# Patient Record
Sex: FEMALE | Race: WHITE | NOT HISPANIC OR LATINO | Employment: FULL TIME | URBAN - METROPOLITAN AREA
[De-identification: names, ages, dates, MRNs, and addresses within clinical notes are randomized per-mention and may not be internally consistent; named-entity substitution may affect disease eponyms.]

---

## 2017-11-14 ENCOUNTER — ANESTHESIA EVENT (OUTPATIENT)
Dept: PERIOP | Facility: HOSPITAL | Age: 62
End: 2017-11-14

## 2017-11-15 ENCOUNTER — PREP FOR SURGERY (OUTPATIENT)
Dept: OTHER | Facility: HOSPITAL | Age: 62
End: 2017-11-15

## 2017-11-15 ENCOUNTER — ANESTHESIA (OUTPATIENT)
Dept: PERIOP | Facility: HOSPITAL | Age: 62
End: 2017-11-15

## 2017-11-15 ENCOUNTER — HOSPITAL ENCOUNTER (OUTPATIENT)
Facility: HOSPITAL | Age: 62
Setting detail: HOSPITAL OUTPATIENT SURGERY
Discharge: HOME | End: 2017-11-15
Attending: INTERNAL MEDICINE | Admitting: INTERNAL MEDICINE

## 2017-11-15 ENCOUNTER — ON CAMPUS - OUTPATIENT (OUTPATIENT)
Dept: URBAN - METROPOLITAN AREA HOSPITAL 28 | Facility: HOSPITAL | Age: 62
End: 2017-11-15

## 2017-11-15 VITALS
RESPIRATION RATE: 15 BRPM | HEART RATE: 88 BPM | BODY MASS INDEX: 41.35 KG/M2 | TEMPERATURE: 97.6 F | DIASTOLIC BLOOD PRESSURE: 88 MMHG | SYSTOLIC BLOOD PRESSURE: 138 MMHG | WEIGHT: 210.6 LBS | HEIGHT: 60 IN | OXYGEN SATURATION: 100 %

## 2017-11-15 DIAGNOSIS — Z86.010 PERSONAL HISTORY OF COLONIC POLYPS: ICD-10-CM

## 2017-11-15 DIAGNOSIS — K63.5 POLYP OF COLON: ICD-10-CM

## 2017-11-15 DIAGNOSIS — Z12.11 SCREEN FOR COLON CANCER: ICD-10-CM

## 2017-11-15 PROCEDURE — 45380 COLONOSCOPY AND BIOPSY: CPT

## 2017-11-15 PROCEDURE — 25010000002 PROPOFOL 10 MG/ML EMULSION: Performed by: NURSE ANESTHETIST, CERTIFIED REGISTERED

## 2017-11-15 RX ORDER — SODIUM CHLORIDE 9 MG/ML
40 INJECTION, SOLUTION INTRAVENOUS AS NEEDED
Status: DISCONTINUED | OUTPATIENT
Start: 2017-11-15 | End: 2017-11-15 | Stop reason: HOSPADM

## 2017-11-15 RX ORDER — GLYCOPYRROLATE 0.2 MG/ML
INJECTION INTRAMUSCULAR; INTRAVENOUS AS NEEDED
Status: DISCONTINUED | OUTPATIENT
Start: 2017-11-15 | End: 2017-11-15 | Stop reason: SURG

## 2017-11-15 RX ORDER — LIDOCAINE HYDROCHLORIDE 20 MG/ML
INJECTION, SOLUTION INFILTRATION; PERINEURAL AS NEEDED
Status: DISCONTINUED | OUTPATIENT
Start: 2017-11-15 | End: 2017-11-15 | Stop reason: SURG

## 2017-11-15 RX ORDER — SODIUM CHLORIDE 0.9 % (FLUSH) 0.9 %
1-10 SYRINGE (ML) INJECTION AS NEEDED
Status: DISCONTINUED | OUTPATIENT
Start: 2017-11-15 | End: 2017-11-15 | Stop reason: HOSPADM

## 2017-11-15 RX ORDER — PROPOFOL 10 MG/ML
VIAL (ML) INTRAVENOUS AS NEEDED
Status: DISCONTINUED | OUTPATIENT
Start: 2017-11-15 | End: 2017-11-15 | Stop reason: SURG

## 2017-11-15 RX ORDER — SODIUM CHLORIDE, SODIUM LACTATE, POTASSIUM CHLORIDE, CALCIUM CHLORIDE 600; 310; 30; 20 MG/100ML; MG/100ML; MG/100ML; MG/100ML
9 INJECTION, SOLUTION INTRAVENOUS CONTINUOUS
Status: DISCONTINUED | OUTPATIENT
Start: 2017-11-15 | End: 2017-11-15 | Stop reason: HOSPADM

## 2017-11-15 RX ORDER — LIDOCAINE HYDROCHLORIDE 10 MG/ML
0.5 INJECTION, SOLUTION EPIDURAL; INFILTRATION; INTRACAUDAL; PERINEURAL ONCE AS NEEDED
Status: COMPLETED | OUTPATIENT
Start: 2017-11-15 | End: 2017-11-15

## 2017-11-15 RX ADMIN — LIDOCAINE HYDROCHLORIDE 100 MG: 20 INJECTION, SOLUTION INFILTRATION; PERINEURAL at 14:12

## 2017-11-15 RX ADMIN — PROPOFOL 50 MG: 10 INJECTION, EMULSION INTRAVENOUS at 14:18

## 2017-11-15 RX ADMIN — PROPOFOL 50 MG: 10 INJECTION, EMULSION INTRAVENOUS at 14:22

## 2017-11-15 RX ADMIN — PROPOFOL 50 MG: 10 INJECTION, EMULSION INTRAVENOUS at 14:15

## 2017-11-15 RX ADMIN — LIDOCAINE HYDROCHLORIDE 0.5 ML: 10 INJECTION, SOLUTION EPIDURAL; INFILTRATION; INTRACAUDAL; PERINEURAL at 12:55

## 2017-11-15 RX ADMIN — PROPOFOL 50 MG: 10 INJECTION, EMULSION INTRAVENOUS at 14:27

## 2017-11-15 RX ADMIN — SODIUM CHLORIDE, POTASSIUM CHLORIDE, SODIUM LACTATE AND CALCIUM CHLORIDE: 600; 310; 30; 20 INJECTION, SOLUTION INTRAVENOUS at 13:10

## 2017-11-15 RX ADMIN — SODIUM CHLORIDE, POTASSIUM CHLORIDE, SODIUM LACTATE AND CALCIUM CHLORIDE 9 ML/HR: 600; 310; 30; 20 INJECTION, SOLUTION INTRAVENOUS at 12:55

## 2017-11-15 RX ADMIN — PROPOFOL 100 MG: 10 INJECTION, EMULSION INTRAVENOUS at 14:13

## 2017-11-15 RX ADMIN — GLYCOPYRROLATE 0.1 MG: 0.2 INJECTION INTRAMUSCULAR; INTRAVENOUS at 14:09

## 2017-11-15 NOTE — PLAN OF CARE
Problem: GI Endoscopy (Adult)  Goal: Signs and Symptoms of Listed Potential Problems Will be Absent or Manageable (GI Endoscopy)  Outcome: Ongoing (interventions implemented as appropriate)    11/15/17 1226   GI Endoscopy   Problems Assessed (GI Endoscopy) all   Problems Present (GI Endoscopy) none

## 2017-11-15 NOTE — ANESTHESIA POSTPROCEDURE EVALUATION
Patient: Gretta Castellon    Procedure Summary     Date Anesthesia Start Anesthesia Stop Room / Location    11/15/17 [id number] [facility] / [facility]       Procedure Diagnosis Surgeon Provider    COLONOSCOPY; POLYPECTOMY (N/A ) Colon polyp  (K62.5) MD Baldo Andujar CRNA          Anesthesia Type: MAC  Last vitals  BP   144/90 (11/15/17 1446)   Temp   97.6 °F (36.4 °C) (11/15/17 1436)   Pulse   86 (11/15/17 1446)   Resp   15 (11/15/17 1446)     SpO2   100 % (11/15/17 1446)     Post Anesthesia Care and Evaluation    Patient location during evaluation: PHASE II  Patient participation: complete - patient participated  Level of consciousness: awake and alert  Pain score: 0  Pain management: adequate  Airway patency: patent  Anesthetic complications: No anesthetic complications  PONV Status: none  Cardiovascular status: acceptable  Respiratory status: acceptable  Hydration status: acceptable

## 2017-11-15 NOTE — PLAN OF CARE
Problem: Patient Care Overview (Adult)  Goal: Plan of Care Review  Outcome: Outcome(s) achieved Date Met:  11/15/17    11/15/17 1445   Coping/Psychosocial Response Interventions   Plan Of Care Reviewed With patient;family   Patient Care Overview   Progress no change   Outcome Evaluation   Outcome Summary/Follow up Plan stable       Goal: Adult Individualization and Mutuality  Outcome: Outcome(s) achieved Date Met:  11/15/17  Goal: Discharge Needs Assessment  Outcome: Outcome(s) achieved Date Met:  11/15/17    Problem: GI Endoscopy (Adult)  Goal: Signs and Symptoms of Listed Potential Problems Will be Absent or Manageable (GI Endoscopy)  Outcome: Outcome(s) achieved Date Met:  11/15/17

## 2017-11-15 NOTE — ANESTHESIA PREPROCEDURE EVALUATION
Anesthesia Evaluation     no history of anesthetic complications:  NPO Solid Status: > 8 hours  NPO Liquid Status: > 6 hours     Airway   Mallampati: I  TM distance: >3 FB  Neck ROM: full  no difficulty expected  Dental      Comment: Multiple caps on molars    Pulmonary - normal exam    breath sounds clear to auscultation  (+) a smoker (remote) Former,   Cardiovascular - normal exam  Exercise tolerance: good (4-7 METS)    Rhythm: regular  Rate: normal    (+) hyperlipidemia      Neuro/Psych- negative ROS  GI/Hepatic/Renal/Endo    (+)  GERD,     Musculoskeletal     Abdominal  - normal exam   Substance History      OB/GYN          Other   (+) arthritis (ffet. toe, hands)                                     Anesthesia Plan    ASA 2     MAC     intravenous induction   Anesthetic plan and risks discussed with patient.

## 2017-11-15 NOTE — OP NOTE
COLONOSCOPY  Procedure Report    Patient Name:  Gretta Castellon  YOB: 1955    Date of Surgery:  11/15/2017     Indications:  Screening for CRC    Pre-op Diagnosis:   K62.5    Post-Op Diagnosis Codes:     * Colon polyp [K63.5]         Procedure/CPT® Codes:      Procedure(s):  COLONOSCOPY; POLYPECTOMY    Staff:  Surgeon(s):  Brock Steele MD         Anesthesia: Monitor Anesthesia Care    Estimated Blood Loss: none    Specimens:   ID Type Source Tests Collected by Time Destination   A :  Polyp Large Intestine, Transverse Colon TISSUE EXAM Brock Steele MD 11/15/2017 1421        Implants:    Nothing was implanted during the procedure      Description of Procedure: After having signed informed consent, she was brought to the endoscopy suite, placed in the left lateral decubitus position and given her IV sedation. Rectal exam revealed no external lesions, normal anal tone, no rectal mass. The scope was introduced in the rectum, advanced under direct visualization with ease through the rectum, sigmoid colon, descending colon, to and around the splenic flexure; reduced; advanced through the transverse colon. As the scope was being advanced through the transverse colon, there was a diminutive 3 x 5 mm polyp that was biopsied, felt to be completely removed, sent as a transverse colon polyp. The scope was reduced and advanced through the transverse colon, to and around the hepatic flexure, reduced in the ascending colon and then advanced using rotational techniques into the cecum. The cecum was well prepped and identified by the appendiceal orifice and the ileocecal valve. It was normal appearing. The ileocecal valve was intubated. The terminal ileum was normal appearing. The scope was withdrawn back in the ascending colon, withdrawn slowly; examined the colon in a circumferential fashion. The colon was well prepped throughout. There were no further mucosal lesions noted throughout the  remainder of the exam, no diverticular openings. The site of polypectomy showed excellent hemostasis, no residual polyp. The scope was withdrawn back into the rectum and retroflexed revealing intact dentate line and no mucosal lesions. The scope was deretroflexed and withdrawn. Patient tolerated the procedure well.           Findings: Colon to TI good Prep  Polyps - transverse cold Biopsy    Complications: none    Recommendations: Results to be called  Repeat in 5 years      Brock Steele MD     Date: 11/15/2017  Time: 2:41 PM

## 2017-11-15 NOTE — BRIEF OP NOTE
COLONOSCOPY  Progress Note    Gretta Castellon  11/15/2017    Pre-op Diagnosis:   K62.5       Post-Op Diagnosis Codes:     * Colon polyp [K63.5]    Procedure/CPT® Codes:      Procedure(s):  COLONOSCOPY; POLYPECTOMY    Surgeon(s):  Brock Steele MD    Anesthesia: Monitor Anesthesia Care    Staff:   Circulator: Reta Gan RN  Scrub Person: Yissel Cowan    Estimated Blood Loss: none    Urine Voided: * No values recorded between 11/15/2017  2:08 PM and 11/15/2017  2:36 PM *    Specimens:                  ID Type Source Tests Collected by Time Destination   A :  Polyp Large Intestine, Transverse Colon TISSUE EXAM Brock Steele MD 11/15/2017 1421          Drains:           Findings: Colon to TI good Prep  Polyps - transverse cold Biopsy    Complications: none      Brock Steele MD     Date: 11/15/2017  Time: 2:39 PM

## 2017-11-15 NOTE — PLAN OF CARE
Problem: Patient Care Overview (Adult)  Goal: Plan of Care Review  Outcome: Ongoing (interventions implemented as appropriate)    11/15/17 1226   Coping/Psychosocial Response Interventions   Plan Of Care Reviewed With patient   Patient Care Overview   Progress improving   Outcome Evaluation   Outcome Summary/Follow up Plan vss, ready for procedure

## 2017-11-15 NOTE — PLAN OF CARE
Problem: Patient Care Overview (Adult)  Goal: Adult Individualization and Mutuality  Outcome: Ongoing (interventions implemented as appropriate)    11/15/17 1226   Individualization   Patient Specific Preferences Goes by Gretta

## 2017-11-15 NOTE — PLAN OF CARE
Problem: GI Endoscopy (Adult)  Goal: Signs and Symptoms of Listed Potential Problems Will be Absent or Manageable (GI Endoscopy)  Outcome: Ongoing (interventions implemented as appropriate)    11/15/17 1411   GI Endoscopy   Problems Assessed (GI Endoscopy) all   Problems Present (GI Endoscopy) none

## 2017-11-15 NOTE — H&P
"Patient Care Team:  Alma Gracia MD as PCP - General (Family Medicine)    CHIEF COMPLAINT: Screening for CRC    HISTORY OF PRESENT ILLNESS:    Last exam was 2007      Past Medical History:   Diagnosis Date   • Hypercholesteremia      Past Surgical History:   Procedure Laterality Date   • BUNIONECTOMY     • CARPAL TUNNEL RELEASE     • COLONOSCOPY     • TUBAL ABDOMINAL LIGATION       History reviewed. No pertinent family history.  Social History   Substance Use Topics   • Smoking status: Former Smoker     Years: 3.00     Types: Cigarettes   • Smokeless tobacco: Never Used   • Alcohol use Yes      Comment: occasional     Prescriptions Prior to Admission   Medication Sig Dispense Refill Last Dose   • ezetimibe-simvastatin (VYTORIN) 10-40 MG per tablet Take 1 tablet by mouth daily. 90 tablet 3 11/14/2017   • ibandronate (BONIVA) 150 MG tablet Take 1 tablet by mouth every 30 (thirty) days. 3 tablet 3 10/27/2017     Allergies:  Penicillins and Sulfa antibiotics    REVIEW OF SYSTEMS:  Please see the above history of present illness for pertinent positives and negatives.  The remainder of the patient's systems have been reviewed and are negative.     Vital Signs  Temp:  [98.7 °F (37.1 °C)] 98.7 °F (37.1 °C)  Heart Rate:  [98] 98  Resp:  [10] 10  BP: (128)/(91) 128/91    Flowsheet Rows         First Filed Value    Admission Height  60\" (152.4 cm) Documented at 11/14/2017 1455    Admission Weight  211 lb (95.7 kg) Documented at 11/14/2017 1455           Physical Exam:  Physical Exam   Constitutional: Patient appears well-developed and well-nourished and in no acute distress   HEENT:   Head: Normocephalic and atraumatic.   Eyes:  Pupils are equal, round, and reactive to light. EOM are intact. Sclera are anicteric and non-injected.  Mouth and Throat: Patient has moist mucous membranes. Oropharynx is clear of any erythema or exudate.     Neck: Neck supple. No JVD present. No thyromegaly present. No lymphadenopathy " present.  Cardiovascular: Regular rate, regular rhythm, S1 normal and S2 normal.  Exam reveals no gallop and no friction rub.  No murmur heard.  Pulmonary/Chest: Lungs are clear to auscultation bilaterally. No respiratory distress. No wheezes. No rhonchi. No rales.   Abdominal: Soft. Bowel sounds are normal. No distension and no mass. There is no hepatosplenomegaly. There is no tenderness.   Musculoskeletal: Normal Muscle tone  Extremities: No edema. Pulses are palpable in all 4 extremities.  Neurological: Patient is alert and oriented to person, place, and time. Cranial nerves II-XII are grossly intact with no focal deficits.  Skin: Skin is warm. No rash noted. Nails show no clubbing.  No cyanosis or erythema.     Results Review:    I reviewed the patient's new clinical results.  Lab Results (most recent)     None          Imaging Results (most recent)     None        reviewed    ECG/EMG Results (most recent)     None        reviewed    Assessment/Plan     Screening for CRC/ Colonoscopy      I discussed the patients findings and my recommendations with patient.     Brock Steele MD  11/15/17  2:05 PM    Time: 10 min prior to procedure.

## 2017-11-20 LAB
LAB AP CASE REPORT: NORMAL
Lab: NORMAL
PATH REPORT.FINAL DX SPEC: NORMAL

## 2024-09-12 ENCOUNTER — OFFICE VISIT (OUTPATIENT)
Dept: ORTHOPEDIC SURGERY | Facility: CLINIC | Age: 69
End: 2024-09-12
Payer: MEDICARE

## 2024-09-12 VITALS — HEIGHT: 60 IN | BODY MASS INDEX: 40.13 KG/M2 | WEIGHT: 204.4 LBS | TEMPERATURE: 98.8 F

## 2024-09-12 DIAGNOSIS — S92.354A CLOSED NONDISPLACED FRACTURE OF FIFTH METATARSAL BONE OF RIGHT FOOT, INITIAL ENCOUNTER: Primary | ICD-10-CM

## 2024-09-12 DIAGNOSIS — M19.071 ARTHRITIS OF FIRST METATARSOPHALANGEAL (MTP) JOINT OF RIGHT FOOT: ICD-10-CM

## 2024-09-12 DIAGNOSIS — M65.28 CALCIFIC ACHILLES TENDONITIS: ICD-10-CM

## 2024-09-12 DIAGNOSIS — R52 PAIN: ICD-10-CM

## 2024-09-12 DIAGNOSIS — M92.61 HAGLUND'S DEFORMITY OF RIGHT HEEL: ICD-10-CM

## 2024-09-12 DIAGNOSIS — M19.079 ARTHRITIS OF FOOT: ICD-10-CM

## 2024-09-12 RX ORDER — EZETIMIBE 10 MG/1
10 TABLET ORAL DAILY
COMMUNITY

## 2024-09-12 RX ORDER — IBUPROFEN 200 MG
TABLET ORAL
COMMUNITY
Start: 2015-05-07

## 2024-09-12 RX ORDER — SIMVASTATIN 40 MG
40 TABLET ORAL NIGHTLY
COMMUNITY

## 2024-09-12 RX ORDER — GINSENG 100 MG
CAPSULE ORAL
COMMUNITY

## 2024-09-12 NOTE — PROGRESS NOTES
New Patient Complaint      Patient: Gretta Castellon  YOB: 1955 68 y.o. female  Medical Record Number: [id number]    Chief Complaints: Foot hurts    History of Present Illness:   Patient reports an approximate 4-week history of moderate pain mainly in the lateral aspect of the right midfoot along the fifth more so than fourth metatarsal.  She does not recall any specific injury but does walk up to 4 miles quite frequently.  She has pain especially after she is standing on this in the shower and goes to lift her foot.  She is some occasional discomfort of the dorsum of the midfoot and into the arch as well.    She had previous bunion correction done by podiatry in the past with stiffness in the great toe subsequent to that but has not been particular painful.       HPI    Allergies:   Allergies   Allergen Reactions    Penicillins     Sulfa Antibiotics        Medications:   Current Outpatient Medications on File Prior to Visit   Medication Sig    Cholecalciferol 125 MCG (5000 UT) tablet Take  by mouth.    ezetimibe (ZETIA) 10 MG tablet Take 1 tablet by mouth Daily.    ibandronate (BONIVA) 150 MG tablet Take 1 tablet by mouth every 30 (thirty) days.    ibuprofen (ADVIL,MOTRIN) 200 MG tablet Take  by mouth.    Multiple Vitamins-Minerals (CENTRUM MINIS WOMEN 50+ PO) Take  by mouth.    simvastatin (ZOCOR) 40 MG tablet Take 1 tablet by mouth Every Night.    Zinc 50 MG tablet Take  by mouth.    [DISCONTINUED] ezetimibe-simvastatin (VYTORIN) 10-40 MG per tablet Take 1 tablet by mouth daily.    [DISCONTINUED] hydrocortisone (ANUSOL-HC) 2.5 % rectal cream Insert  into the rectum 2 (Two) Times a Day.     No current facility-administered medications on file prior to visit.       Past Medical History:   Diagnosis Date    Hypercholesteremia     Stress fracture     Maybe?     Past Surgical History:   Procedure Laterality Date    BUNIONECTOMY      CARPAL TUNNEL RELEASE      COLONOSCOPY      COLONOSCOPY N/A  "11/15/2017    Procedure: COLONOSCOPY; POLYPECTOMY;  Surgeon: Brock Steele MD;  Location: [facility];  Service:     HAND SURGERY  1979 right hand    Carpel tunnel    TUBAL ABDOMINAL LIGATION       Social History     Occupational History    Not on file   Tobacco Use    Smoking status: Former     Types: Cigarettes     Passive exposure: Past    Smokeless tobacco: Never    Tobacco comments:     As a teenager quit before age 20   Vaping Use    Vaping status: Never Used   Substance and Sexual Activity    Alcohol use: Yes     Comment: Just occasionally nothing regular    Drug use: No    Sexual activity: Never      Social History     Social History Narrative    Not on file     History reviewed. No pertinent family history.    Review of Systems: 14 point review of systems performed, positive pertinent findings identified in HPI. All remaining systems negative     Review of Systems      Physical Exam:   Vitals:    09/12/24 0801   Temp: 98.8 °F (37.1 °C)   Weight: 92.7 kg (204 lb 6.4 oz)   Height: 152.4 cm (60\")   PainSc:   3   PainLoc: Foot     Physical Exam   Constitutional: pleasant, well developed   Eyes: sclera non icteric  Hearing : adequate for exam  Cardiovascular: palpable pulses in right foot, right calf/ thigh NT without sign of DVT  Respiratoy: breathing unlabored   Neurological: grossly sensate to LT throughout right LE  Psychiatric: oriented with normal mood and affect.   Lymphatic: No palpable popliteal lymphadenopathy right LE  Skin: intact throughout right leg/foot  Musculoskeletal: On exam she has mild tenderness to palpation along the fifth more so than fourth metatarsal.  Mild discomfort along the dorsum of the midfoot.  There is well-healed incision on the first MTP joint with very limited motion but without focal tenderness to palpation.  She was nontender about the ankle  Physical Exam  Ortho Exam    Radiology: 3 views of the right foot ordered evaluate pain and alignment reviewed and no " prior x-rays available for comparison these x-rays show previous bunion correction with retained screw there is severe arthritis of the first metatarsal phalangeal joint.  There is also arthritis at the first TMT joint and some to the second TMT joint.  There is chronic changes along the proximal fifth metatarsal with some exostosis at the proximal far lateral extent and may have been previous stress fracture or stress reaction of the fourth and fifth metatarsals at the proximal one fourth.  There is hammering of the lesser toes.  There is also spurring at the insertion of the Achilles and plantarly with prominent superior calcaneal tuberosity.    Assessment/Plan: 1.  Right first MTP arthritis status post bunion correction with retained hardware  2.  Right midfoot arthritis   3.  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  Right probable proximal fifth and possibly fourth metatarsal stress fractures    We discussed treatment going forward and the first MTP arthritis not particularly symptomatic.  Main area of pain is in the lateral midfoot and I think she has developed a stress fracture release the fifth and possibly fourth metatarsals.  Does have some arthritis at the midfoot as well.    We discussed treatment and she was fitted with a short boot and instructed on using this for any weightbearing activity including offloading with a cane in the contralateral hand and to stick to activities of daily living only and avoid walking for exercise.    We discussed possible surgical treatment for this which she would wish to avoid if possible.  Will go ahead and get an MRI of her right foot to evaluate for any fracture of the fourth metatarsal as well as possible fifth proximal metatarsal stress fracture.    Will see her back in about 3 weeks to assess progress and determine treatment course going forward.

## 2024-09-24 ENCOUNTER — HOSPITAL ENCOUNTER (OUTPATIENT)
Dept: MRI IMAGING | Facility: HOSPITAL | Age: 69
Discharge: HOME | End: 2024-09-24
Admitting: ORTHOPAEDIC SURGERY
Payer: MEDICARE

## 2024-09-24 DIAGNOSIS — S92.354A CLOSED NONDISPLACED FRACTURE OF FIFTH METATARSAL BONE OF RIGHT FOOT, INITIAL ENCOUNTER: ICD-10-CM

## 2024-09-24 PROCEDURE — 73718 MRI LOWER EXTREMITY W/O DYE: CPT

## 2024-10-03 ENCOUNTER — LAB (OUTPATIENT)
Dept: LAB | Facility: HOSPITAL | Age: 69
End: 2024-10-03
Payer: MEDICARE

## 2024-10-03 ENCOUNTER — OFFICE VISIT (OUTPATIENT)
Dept: ORTHOPEDIC SURGERY | Facility: CLINIC | Age: 69
End: 2024-10-03
Payer: MEDICARE

## 2024-10-03 VITALS — WEIGHT: 204 LBS | BODY MASS INDEX: 40.05 KG/M2 | HEIGHT: 60 IN | TEMPERATURE: 97.5 F

## 2024-10-03 DIAGNOSIS — M19.071 ARTHRITIS OF FIRST METATARSOPHALANGEAL (MTP) JOINT OF RIGHT FOOT: ICD-10-CM

## 2024-10-03 DIAGNOSIS — S92.354A CLOSED NONDISPLACED FRACTURE OF FIFTH METATARSAL BONE OF RIGHT FOOT, INITIAL ENCOUNTER: ICD-10-CM

## 2024-10-03 DIAGNOSIS — E55.9 VITAMIN D DEFICIENCY: Primary | ICD-10-CM

## 2024-10-03 DIAGNOSIS — M92.61 HAGLUND'S DEFORMITY OF RIGHT HEEL: ICD-10-CM

## 2024-10-03 DIAGNOSIS — S92.344S CLOSED NONDISPLACED FRACTURE OF FOURTH METATARSAL BONE OF RIGHT FOOT, SEQUELA: ICD-10-CM

## 2024-10-03 DIAGNOSIS — R52 PAIN: ICD-10-CM

## 2024-10-03 DIAGNOSIS — M19.071 ARTHRITIS OF RIGHT FOOT: ICD-10-CM

## 2024-10-03 DIAGNOSIS — E55.9 VITAMIN D DEFICIENCY: ICD-10-CM

## 2024-10-03 LAB — 25(OH)D3 SERPL-MCNC: 54.5 NG/ML (ref 30–100)

## 2024-10-03 PROCEDURE — 36415 COLL VENOUS BLD VENIPUNCTURE: CPT

## 2024-10-03 PROCEDURE — 82306 VITAMIN D 25 HYDROXY: CPT

## 2024-10-03 NOTE — PROGRESS NOTES
"Foot Follow Up      Patient: Gretta Castellon    YOB: 1955 68 y.o. female    Chief Complaints: Foot feeling better    History of Present Illness:Patient was seen on 9/12/2024 reporting an approximate 4-week history of moderate pain mainly in the lateral aspect of the right midfoot along the fifth more so than fourth metatarsal.  She did not recall any specific injury but had been walking up to 4 miles quite frequently.  She had pain especially after she was standing on this in the shower and lifted her foot.  She had some occasional discomfort of the dorsum of the midfoot and into the arch as well.     She had had previous bunion correction done by podiatry in the past with stiffness in the great toe subsequent to that but ha    We discussed treatment going forward I felt she likely had a 5th metatarsal stress fracture and possibly of the fourth as well and additionally had first MTP arthritis with retained hardware from previous bunion correction as well as mild midfoot arthritis and some insertional Achilles calcifications.  She was fitted with a short boot and instructed on offloading with a cane and to limit activities to that of daily living only.  She wished avoid surgical treatment if at all possible.  She was sent for MRI for further evaluation    Patient is seen back today stating that her foot is feeling better she no longer has pain when she is in the shower and has been using her boot and cane and limiting her activity. pain is rated at 0 out of 10    She reports that she does take vitamin D 5000 units twice weekly.  She had a history of vitamin D deficiency about 6 years ago.  HPI    ROS: No foot pain  Past Medical History:   Diagnosis Date    CTS (carpal tunnel syndrome) 1979    Right hand surgery    Hypercholesteremia     Stress fracture     Maybe?     Physical Exam:   Vitals:    10/03/24 0925   Temp: 97.5 °F (36.4 °C)   Weight: 92.5 kg (204 lb)   Height: 152.4 cm (60\")   PainSc: 0-No " pain     Well developed with normal mood.  On exam she was nontender over the base of the fifth metatarsal nor the fourth.  She had good eversion strength.      Radiology: 3 views of the right foot ordered evaluate fracture alignment reviewed and compared to previous x-rays.  Fracture line is still slightly evident along the fifth metaphyseal diaphyseal junction there is some chronic hypertrophic callus lateral to this fracture line is somewhat less discernible than on previous x-rays.  There remains arthritis of the first MTP joint and some sclerotic change on the proximal fourth metatarsal consistent with likely old fracture.    MRI films and report of the right foot dated 9/24/2024 show edema-like marrow signal within the base and shaft of the fifth metatarsal with associated nondisplaced incomplete fracture of the fifth metatarsal base without significant displacement.  There was additional fracture line to the base of the fourth metatarsal without associated edema.  There was surgical change of the first MTP joint with moderate to severe arthritis and scattered midfoot arthritis.    Vitamin D 6/20/2022 87.3      Assessment/Plan: Right proximal fifth metatarsal stress fracture  2.  Right proximal fourth metatarsal fracture without edema-likely remote  3.  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  Right midfoot arthritis  5.  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    She has a history of vitamin D deficiency and takes 5000 units twice a week and her last level was good but this was several years ago.  Will send her over to the lab to have this rechecked and make sure we do not need to do  further augmentation.    Will have her continue with her boot full-time for weightbearing for another 2 weeks along with her cane and if doing well at that point may then start transitioning out of it to a sturdy stiff shoe with a lateral heel wedge in the house for a week and if doing well may then do as such in and out of the house.    She may do low impact exercise with her boot on at [employer] such as a recumbent elliptical.    If anything worsens she will let me know otherwise I will see her back in 4 weeks with x-rays of her right foot.

## 2024-10-04 ENCOUNTER — TELEPHONE (OUTPATIENT)
Dept: ORTHOPEDIC SURGERY | Facility: CLINIC | Age: 69
End: 2024-10-04
Payer: MEDICARE

## 2024-10-04 NOTE — TELEPHONE ENCOUNTER
----- Message from Sam Queen sent at 10/4/2024  1:35 PM EDT -----  Please let patient know that her vitamin D is at an adequate level and is not deemed to be augmented beyond what she is already taking.

## 2024-10-04 NOTE — TELEPHONE ENCOUNTER
Serum vitamin D level is 54.5.  Have left a message for the patient that her vitamin D level is in adequate range and no further changes to medications need to be made.  Have left it open for her to call if she has any questions or concerns

## 2024-10-31 ENCOUNTER — OFFICE VISIT (OUTPATIENT)
Dept: ORTHOPEDIC SURGERY | Facility: CLINIC | Age: 69
End: 2024-10-31
Payer: MEDICARE

## 2024-10-31 VITALS — WEIGHT: 208.8 LBS | BODY MASS INDEX: 40.99 KG/M2 | HEIGHT: 60 IN | TEMPERATURE: 97.3 F

## 2024-10-31 DIAGNOSIS — Z09 FOLLOW-UP EXAM: Primary | ICD-10-CM

## 2024-10-31 DIAGNOSIS — M19.071 ARTHRITIS OF FIRST METATARSOPHALANGEAL (MTP) JOINT OF RIGHT FOOT: ICD-10-CM

## 2024-10-31 DIAGNOSIS — S92.354D CLOSED NONDISPLACED FRACTURE OF FIFTH METATARSAL BONE OF RIGHT FOOT WITH ROUTINE HEALING, SUBSEQUENT ENCOUNTER: ICD-10-CM

## 2024-10-31 DIAGNOSIS — M92.61 HAGLUND'S DEFORMITY OF RIGHT HEEL: ICD-10-CM

## 2024-10-31 DIAGNOSIS — S92.344S CLOSED NONDISPLACED FRACTURE OF FOURTH METATARSAL BONE OF RIGHT FOOT, SEQUELA: ICD-10-CM

## 2024-10-31 DIAGNOSIS — M19.071 ARTHRITIS OF RIGHT FOOT: ICD-10-CM

## 2024-10-31 NOTE — PROGRESS NOTES
"Foot Follow Up      Patient: Gretta Castellon    YOB: 1955 68 y.o. female    Chief Complaints: Foot \"doing good\"    History of Present Illness:Patient was seen on 9/12/2024 reporting an approximate 4-week history of moderate pain mainly in the lateral aspect of the right midfoot along the fifth more so than fourth metatarsal.  She did not recall any specific injury but had been walking up to 4 miles quite frequently.  She had pain especially after she was standing on this in the shower and lifted her foot.  She had some occasional discomfort of the dorsum of the midfoot and into the arch as well.     She had had previous bunion correction done by podiatry in the past with stiffness in the great toe subsequent to that but ha     We discussed treatment going forward I felt she likely had a 5th metatarsal stress fracture and possibly of the fourth as well and additionally had first MTP arthritis with retained hardware from previous bunion correction as well as mild midfoot arthritis and some insertional Achilles calcifications.  She was fitted with a short boot and instructed on offloading with a cane and to limit activities to that of daily living only.  She wished avoid surgical treatment if at all possible.  She was sent for MRI for further evaluation     Patient seen on 10/3/2024 stating that her foot was feeling better she no longer had pain when she was in the shower she had been using her boot and cane and limiting her activity. pain was rated at 0 out of 10     She reported that she does take vitamin D 5000 units twice weekly.  She had a history of vitamin D deficiency about 6 years prior.  Given her improvement clinically and somewhat radiographically I did not recommend surgical treatment nor did she desire any.  With her history of vitamin D deficiency she was taking 5000 units twice a week and her last level was good several years ago but she was sent to the lab to have this rechecked.  She " "was instructed continue with her boot full-time for another 2 weeks along with her cane and if doing well to start transitioning out of it to a stiff sturdy shoe with a lateral heel wedge in the house for a week and gradually do as such out of the house.  She was allowed to do low impact exercise with her boot on at [email or web address].    on 10/3/2024 her vitamin D was found to be sufficient at 54.5.    Patient is seen back today reporting that she is doing good.  She has an occasional twinge.  She has been using tennis shoes over the last week and not using a cane without appreciable pain in the  right foot other than occasional twinge.  She has tolerated doing a seated elliptical pain is rated 0 out of 10  HPI    ROS: Foot pain  Past Medical History:   Diagnosis Date    CTS (carpal tunnel syndrome) 1979    Right hand surgery    Hypercholesteremia     Stress fracture     Maybe?     Physical Exam:   Vitals:    10/31/24 0843   Temp: 97.3 °F (36.3 °C)   Weight: 94.7 kg (208 lb 12.8 oz)   Height: 152.4 cm (60\")   PainSc: 0-No pain     Well developed with normal mood.  Exam she was nontender along the right fifth metatarsal nor the fourth metatarsal.      Radiology: 3 views right foot ordered evaluate fracture reviewed and compared to previous x-rays.  There is increased callus around the fracture line.  Fracture line is still slightly evident laterally but certainly nothing that appears complete.  The remains arthritis of the first MTP joint with previous bunion correction.    Vitamin D 10/3/2024 54.5  Vitamin D 6/20/2022 87.3     Assessment/Plan: 1.  Right proximal fifth metatarsal stress fracture  2.  Right proximal fourth metatarsal fracture without edema-likely remote  3.  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  Right midfoot arthritis  5.  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    She we will continue with sturdy athletic shoes and advance activity slowly as tolerated recommending that she stick with low impact exercise.    We discussed follow-up and decided she would just call to schedule follow-up appointment if she has any recurrent persistent worsening symptoms otherwise by mutual agreement I will see her back as needed

## (undated) DEVICE — VIAL FORMALIN CAP 10P 40ML

## (undated) DEVICE — SUCTION CANISTER, 3000CC,SAFELINER: Brand: DEROYAL

## (undated) DEVICE — JACKT LAB F/R KNIT CUFF/COLR XLG BLU

## (undated) DEVICE — SPNG GZ WOVN 4X4IN 12PLY 10/BX STRL

## (undated) DEVICE — FRCP BX RADJAW4 NDL 2.8 240CM LG OG BX40

## (undated) DEVICE — SYR LL 3CC

## (undated) DEVICE — BW-412T DISP COMBO CLEANING BRUSH: Brand: SINGLE USE COMBINATION CLEANING BRUSH

## (undated) DEVICE — Device

## (undated) DEVICE — GOWN ISOL W/THUMB UNIV BLU BX/15

## (undated) DEVICE — GLV SURG NEOLON 2G PF LF 7.5 STRL

## (undated) DEVICE — Device: Brand: DEFENDO AIR/WATER/SUCTION AND BIOPSY VALVE

## (undated) DEVICE — MASK,FACE,SHIELD,BLUE,ANTI FOG,TIES: Brand: MEDLINE